# Patient Record
Sex: MALE | Race: ASIAN
[De-identification: names, ages, dates, MRNs, and addresses within clinical notes are randomized per-mention and may not be internally consistent; named-entity substitution may affect disease eponyms.]

---

## 2023-07-07 ENCOUNTER — HOSPITAL ENCOUNTER (INPATIENT)
Dept: HOSPITAL 12 - ER | Age: 78
LOS: 7 days | Discharge: HOME HEALTH | DRG: 421 | End: 2023-07-14
Attending: INTERNAL MEDICINE
Payer: COMMERCIAL

## 2023-07-07 VITALS — WEIGHT: 137 LBS | HEIGHT: 67 IN | BODY MASS INDEX: 21.5 KG/M2

## 2023-07-07 DIAGNOSIS — E87.6: ICD-10-CM

## 2023-07-07 DIAGNOSIS — F01.50: ICD-10-CM

## 2023-07-07 DIAGNOSIS — D63.8: ICD-10-CM

## 2023-07-07 DIAGNOSIS — G93.89: ICD-10-CM

## 2023-07-07 DIAGNOSIS — E88.09: ICD-10-CM

## 2023-07-07 DIAGNOSIS — K29.70: ICD-10-CM

## 2023-07-07 DIAGNOSIS — E86.0: ICD-10-CM

## 2023-07-07 DIAGNOSIS — R13.10: ICD-10-CM

## 2023-07-07 DIAGNOSIS — I50.32: ICD-10-CM

## 2023-07-07 DIAGNOSIS — M17.11: ICD-10-CM

## 2023-07-07 DIAGNOSIS — E87.1: ICD-10-CM

## 2023-07-07 DIAGNOSIS — R62.7: Primary | ICD-10-CM

## 2023-07-07 DIAGNOSIS — Z79.899: ICD-10-CM

## 2023-07-07 DIAGNOSIS — Z20.822: ICD-10-CM

## 2023-07-07 DIAGNOSIS — N40.0: ICD-10-CM

## 2023-07-07 DIAGNOSIS — D72.829: ICD-10-CM

## 2023-07-07 DIAGNOSIS — Z86.73: ICD-10-CM

## 2023-07-07 DIAGNOSIS — Z74.09: ICD-10-CM

## 2023-07-07 DIAGNOSIS — N28.1: ICD-10-CM

## 2023-07-07 DIAGNOSIS — D68.59: ICD-10-CM

## 2023-07-07 DIAGNOSIS — M65.261: ICD-10-CM

## 2023-07-07 DIAGNOSIS — R80.9: ICD-10-CM

## 2023-07-07 DIAGNOSIS — E43: ICD-10-CM

## 2023-07-07 DIAGNOSIS — G92.8: ICD-10-CM

## 2023-07-07 DIAGNOSIS — E78.5: ICD-10-CM

## 2023-07-07 DIAGNOSIS — Z79.82: ICD-10-CM

## 2023-07-07 DIAGNOSIS — R74.01: ICD-10-CM

## 2023-07-07 DIAGNOSIS — I11.0: ICD-10-CM

## 2023-07-07 DIAGNOSIS — N17.0: ICD-10-CM

## 2023-07-07 DIAGNOSIS — D75.839: ICD-10-CM

## 2023-07-07 DIAGNOSIS — R59.0: ICD-10-CM

## 2023-07-07 LAB
ALP SERPL-CCNC: 267 U/L (ref 50–136)
ALT SERPL W/O P-5'-P-CCNC: 50 U/L (ref 16–63)
AST SERPL-CCNC: 42 U/L (ref 15–37)
BILIRUB DIRECT SERPL-MCNC: 0.4 MG/DL (ref 0–0.2)
BILIRUB SERPL-MCNC: 0.8 MG/DL (ref 0.2–1)
BUN SERPL-MCNC: 11 MG/DL (ref 7–18)
CHLORIDE SERPL-SCNC: 103 MMOL/L (ref 98–107)
CO2 SERPL-SCNC: 25 MMOL/L (ref 21–32)
CREAT SERPL-MCNC: 1.4 MG/DL (ref 0.6–1.3)
HCT VFR BLD AUTO: 35.2 % (ref 36.7–47.1)
MCH RBC QN AUTO: 31 UUG (ref 23.8–33.4)
MCV RBC AUTO: 92.2 FL (ref 73–96.2)
PLATELET # BLD AUTO: 326 K/UL (ref 152–348)
POTASSIUM SERPL-SCNC: 3.5 MMOL/L (ref 3.5–5.1)
WS STN SPEC: 8.3 G/DL (ref 6.4–8.2)

## 2023-07-07 PROCEDURE — G0378 HOSPITAL OBSERVATION PER HR: HCPCS

## 2023-07-07 PROCEDURE — C9113 INJ PANTOPRAZOLE SODIUM, VIA: HCPCS

## 2023-07-08 VITALS — SYSTOLIC BLOOD PRESSURE: 142 MMHG | OXYGEN SATURATION: 98 % | TEMPERATURE: 98.6 F | DIASTOLIC BLOOD PRESSURE: 78 MMHG

## 2023-07-08 VITALS — SYSTOLIC BLOOD PRESSURE: 126 MMHG | TEMPERATURE: 99.4 F | DIASTOLIC BLOOD PRESSURE: 61 MMHG | OXYGEN SATURATION: 96 %

## 2023-07-08 VITALS — OXYGEN SATURATION: 98 % | TEMPERATURE: 98.8 F | DIASTOLIC BLOOD PRESSURE: 71 MMHG | SYSTOLIC BLOOD PRESSURE: 131 MMHG

## 2023-07-08 VITALS — TEMPERATURE: 98.5 F | OXYGEN SATURATION: 99 % | SYSTOLIC BLOOD PRESSURE: 132 MMHG | DIASTOLIC BLOOD PRESSURE: 73 MMHG

## 2023-07-08 VITALS — SYSTOLIC BLOOD PRESSURE: 139 MMHG | DIASTOLIC BLOOD PRESSURE: 67 MMHG | TEMPERATURE: 98.7 F | OXYGEN SATURATION: 96 %

## 2023-07-08 LAB
APPEARANCE UR: CLEAR
BILIRUB UR QL STRIP: NEGATIVE
COLOR UR: YELLOW
GLUCOSE UR STRIP-MCNC: NEGATIVE MG/DL
HGB UR QL STRIP: NEGATIVE
KETONES UR STRIP-MCNC: (no result) MG/DL
LEUKOCYTE ESTERASE UR QL STRIP: NEGATIVE
NITRITE UR QL STRIP: NEGATIVE
PH UR STRIP: 7 [PH] (ref 5–8)
SP GR UR STRIP: 1.01 (ref 1–1.03)
UROBILINOGEN UR STRIP-MCNC: 0.2 E.U./DL

## 2023-07-08 RX ADMIN — HEPARIN SODIUM SCH UNITS: 5000 INJECTION, SOLUTION INTRAVENOUS; SUBCUTANEOUS at 08:21

## 2023-07-08 RX ADMIN — DEXTROSE AND SODIUM CHLORIDE PRN MLS/HR: 5; .45 INJECTION, SOLUTION INTRAVENOUS at 12:49

## 2023-07-08 RX ADMIN — HEPARIN SODIUM SCH UNITS: 5000 INJECTION, SOLUTION INTRAVENOUS; SUBCUTANEOUS at 20:41

## 2023-07-08 RX ADMIN — DEXTROSE AND SODIUM CHLORIDE PRN MLS/HR: 5; .45 INJECTION, SOLUTION INTRAVENOUS at 01:52

## 2023-07-08 RX ADMIN — PANTOPRAZOLE SODIUM SCH MG: 40 TABLET, DELAYED RELEASE ORAL at 06:30

## 2023-07-08 RX ADMIN — ACETAMINOPHEN PRN MG: 325 TABLET ORAL at 14:40

## 2023-07-08 NOTE — NUR
Pt rested well in between care; no acute distress; urine sent to lab; needs attended; safety 
maintained.

## 2023-07-08 NOTE — NUR
REPORT GIVEN BY AM NURSE JOHNSON PT HAS IVF D51/2NS INFUSING AT 75ML/HR PT TOLERATING WELL NO 
SIGNS OF DISTRESS NOTED.  PT GIVEN MEDICATION AS ORDERED NO SIGNS OF BLEEDING FROM GIVEN 
HEPARIN PLATELETS 326.  PT CARE GIVEN AND BILATERAL WRIST RESTRAINTS CHECKED AND OFFERED 
FLUIDS  PT REFUSED NO SIGNS OF BRUISING FROM SITE WILL GIVE MEDICATION AS ORDERED NO SIGNS 
OF  ADVERSE REACTION MEDICATION WILL CONTINUE TO MONITOR FOR SAFETY AND FALL HOURLY AND PRN.

## 2023-07-09 VITALS — TEMPERATURE: 100.6 F | SYSTOLIC BLOOD PRESSURE: 117 MMHG | OXYGEN SATURATION: 97 % | DIASTOLIC BLOOD PRESSURE: 60 MMHG

## 2023-07-09 VITALS — DIASTOLIC BLOOD PRESSURE: 68 MMHG | TEMPERATURE: 99.4 F | OXYGEN SATURATION: 96 % | SYSTOLIC BLOOD PRESSURE: 128 MMHG

## 2023-07-09 VITALS — DIASTOLIC BLOOD PRESSURE: 75 MMHG | SYSTOLIC BLOOD PRESSURE: 124 MMHG | OXYGEN SATURATION: 98 % | TEMPERATURE: 98.8 F

## 2023-07-09 VITALS — TEMPERATURE: 98.6 F | OXYGEN SATURATION: 94 % | SYSTOLIC BLOOD PRESSURE: 150 MMHG | DIASTOLIC BLOOD PRESSURE: 65 MMHG

## 2023-07-09 LAB
ALP SERPL-CCNC: 209 U/L (ref 50–136)
ALT SERPL W/O P-5'-P-CCNC: 32 U/L (ref 16–63)
AST SERPL-CCNC: 27 U/L (ref 15–37)
BILIRUB SERPL-MCNC: 0.8 MG/DL (ref 0.2–1)
BUN SERPL-MCNC: 6 MG/DL (ref 7–18)
CHLORIDE SERPL-SCNC: 103 MMOL/L (ref 98–107)
CO2 SERPL-SCNC: 24 MMOL/L (ref 21–32)
CREAT SERPL-MCNC: 1.4 MG/DL (ref 0.6–1.3)
HCT VFR BLD AUTO: 31.7 % (ref 36.7–47.1)
MAGNESIUM SERPL-MCNC: 2 MG/DL (ref 1.8–2.4)
MCH RBC QN AUTO: 31.1 UUG (ref 23.8–33.4)
MCV RBC AUTO: 92.4 FL (ref 73–96.2)
PHOSPHATE SERPL-MCNC: 3.1 MG/DL (ref 2.5–4.9)
PLATELET # BLD AUTO: 365 K/UL (ref 152–348)
POTASSIUM SERPL-SCNC: 3.3 MMOL/L (ref 3.5–5.1)
WS STN SPEC: 7.6 G/DL (ref 6.4–8.2)

## 2023-07-09 RX ADMIN — PANTOPRAZOLE SODIUM SCH MG: 40 TABLET, DELAYED RELEASE ORAL at 06:58

## 2023-07-09 RX ADMIN — HEPARIN SODIUM SCH UNITS: 5000 INJECTION, SOLUTION INTRAVENOUS; SUBCUTANEOUS at 10:00

## 2023-07-09 RX ADMIN — ACETAMINOPHEN PRN MG: 325 TABLET ORAL at 11:14

## 2023-07-09 RX ADMIN — DEXTROSE SCH MLS/HR: 50 INJECTION, SOLUTION INTRAVENOUS at 18:25

## 2023-07-09 RX ADMIN — ACETAMINOPHEN PRN MG: 325 TABLET ORAL at 11:08

## 2023-07-09 RX ADMIN — POTASSIUM CHLORIDE SCH MLS/HR: 14.9 INJECTION, SOLUTION INTRAVENOUS at 10:13

## 2023-07-09 RX ADMIN — POTASSIUM CHLORIDE SCH MLS/HR: 14.9 INJECTION, SOLUTION INTRAVENOUS at 11:14

## 2023-07-09 RX ADMIN — HEPARIN SODIUM SCH UNITS: 5000 INJECTION, SOLUTION INTRAVENOUS; SUBCUTANEOUS at 20:19

## 2023-07-09 RX ADMIN — DEXTROSE AND SODIUM CHLORIDE PRN MLS/HR: 5; .45 INJECTION, SOLUTION INTRAVENOUS at 07:11

## 2023-07-09 RX ADMIN — DEXTROSE AND SODIUM CHLORIDE PRN MLS/HR: 5; .45 INJECTION, SOLUTION INTRAVENOUS at 21:03

## 2023-07-09 NOTE — NUR
Rcvd pt in bed resting comfortably. No s/s of respiratory distress. Bilateral soft restraint 
released q 2 hours to check circulation. IV right FA #20 patent and intact running D5 1/2 NS 
@75 cc/hr. Family on bed side. 2 bags of potassium given and pt. tolerated it well.

## 2023-07-09 NOTE — NUR
Telephone call to patient daughter Jagruti and obtained telephone consent for patient's CT 
abd/pelvis with contrast, consent witnessed by Charge nurse Kathrine FOWLER Called radiology 
tech/Zane and informed that consent is obtained.

## 2023-07-09 NOTE — NUR
Right FA IV patent and intact with D51/2 NS running at 75 cc/hr. MD orders US KIDNEY, CHEST 
X-RAY, AND SWALLOW EVAL. FAMILY WILL BRING HOME MEDS NOEMI 07/10/23. REPOSITION PT AND RELEASE 
 BILATERAL SOFT WRIST RESTRAINT TO CHECK CIRCULATION. Potassium 20 meq iv, given as ordered.

## 2023-07-09 NOTE — NUR
Received patient lying in bed. Asleep, arouse to touch. Alert orientedx1-2. In no apparent 
distress. No signs or symptoms of pain or SOB. IV site on left AC and right hand intact and 
patent. IVF infusing. Bilateral soft wrist restraint in place. Circulation checked. Needs 
assessed and anticipated to. Safety measure initiated. Continue to monitor.

## 2023-07-10 VITALS — OXYGEN SATURATION: 96 % | DIASTOLIC BLOOD PRESSURE: 68 MMHG | SYSTOLIC BLOOD PRESSURE: 121 MMHG | TEMPERATURE: 98.4 F

## 2023-07-10 VITALS — SYSTOLIC BLOOD PRESSURE: 127 MMHG | DIASTOLIC BLOOD PRESSURE: 64 MMHG | TEMPERATURE: 97.6 F | OXYGEN SATURATION: 97 %

## 2023-07-10 VITALS — OXYGEN SATURATION: 98 % | DIASTOLIC BLOOD PRESSURE: 75 MMHG | TEMPERATURE: 99.3 F | SYSTOLIC BLOOD PRESSURE: 143 MMHG

## 2023-07-10 VITALS — TEMPERATURE: 98.4 F | SYSTOLIC BLOOD PRESSURE: 154 MMHG | DIASTOLIC BLOOD PRESSURE: 62 MMHG

## 2023-07-10 LAB
ALP SERPL-CCNC: 267 U/L (ref 50–136)
ALT SERPL W/O P-5'-P-CCNC: 56 U/L (ref 16–63)
AST SERPL-CCNC: 53 U/L (ref 15–37)
BILIRUB SERPL-MCNC: 1 MG/DL (ref 0.2–1)
BUN SERPL-MCNC: 7 MG/DL (ref 7–18)
CHLORIDE SERPL-SCNC: 99 MMOL/L (ref 98–107)
CO2 SERPL-SCNC: 24 MMOL/L (ref 21–32)
CREAT SERPL-MCNC: 1.3 MG/DL (ref 0.6–1.3)
HCT VFR BLD AUTO: 29.4 % (ref 36.7–47.1)
MAGNESIUM SERPL-MCNC: 1.8 MG/DL (ref 1.8–2.4)
MCH RBC QN AUTO: 31.1 UUG (ref 23.8–33.4)
MCV RBC AUTO: 90.2 FL (ref 73–96.2)
PHOSPHATE SERPL-MCNC: 3 MG/DL (ref 2.5–4.9)
PLATELET # BLD AUTO: 416 K/UL (ref 152–348)
POTASSIUM SERPL-SCNC: 3.2 MMOL/L (ref 3.5–5.1)
WS STN SPEC: 8.3 G/DL (ref 6.4–8.2)

## 2023-07-10 RX ADMIN — DEXTROSE SCH MLS/HR: 50 INJECTION, SOLUTION INTRAVENOUS at 17:58

## 2023-07-10 RX ADMIN — POTASSIUM CHLORIDE SCH MLS/HR: 14.9 INJECTION, SOLUTION INTRAVENOUS at 15:18

## 2023-07-10 RX ADMIN — DEXTROSE SCH MG: 50 INJECTION, SOLUTION INTRAVENOUS at 08:29

## 2023-07-10 RX ADMIN — POTASSIUM CHLORIDE SCH MLS/HR: 14.9 INJECTION, SOLUTION INTRAVENOUS at 12:00

## 2023-07-10 RX ADMIN — DEXTROSE AND SODIUM CHLORIDE PRN MLS/HR: 5; .45 INJECTION, SOLUTION INTRAVENOUS at 12:07

## 2023-07-10 RX ADMIN — POTASSIUM CHLORIDE SCH MLS/HR: 14.9 INJECTION, SOLUTION INTRAVENOUS at 14:17

## 2023-07-10 RX ADMIN — POTASSIUM CHLORIDE SCH MLS/HR: 14.9 INJECTION, SOLUTION INTRAVENOUS at 12:05

## 2023-07-10 RX ADMIN — HEPARIN SODIUM SCH UNITS: 5000 INJECTION, SOLUTION INTRAVENOUS; SUBCUTANEOUS at 08:29

## 2023-07-10 RX ADMIN — HEPARIN SODIUM SCH UNITS: 5000 INJECTION, SOLUTION INTRAVENOUS; SUBCUTANEOUS at 20:22

## 2023-07-10 NOTE — NUR
No adverse effect noted from IV ATB. 4 bags of Potassium given for hypokalemia. Pt. is now 
in pureed diet with medications crushed. Tylenol 650 mg as ordered for slight elevated temp.

## 2023-07-10 NOTE — NUR
Rcvd pt. in bed. No distress noted. Released pt bilateral soft wrist restraint to check 
circulation. Family on bedside. Started 40meq potassium. Awaiting for swallow evaluation.

## 2023-07-10 NOTE — NUR
Received patient lying in bed. Asleep but arouse to tactile stimuli. AOx1-2. In no acute 
distress. No signs or symptoms of pain or SOB. IV site on left AC and right hand intact and 
patent. IVF infusing. Tio. wrist restraint in place. Circulation checked. Needs assessed and 
attended to. Safety measure initiated and call light within reached.

## 2023-07-10 NOTE — NUR
No adverse reaction noted from IV antibiotics. IVF continue to infused. Tio soft wrist 
restraints remains in place. Safety measure maintained and call light within reached.

## 2023-07-11 VITALS — TEMPERATURE: 99.2 F | SYSTOLIC BLOOD PRESSURE: 119 MMHG | DIASTOLIC BLOOD PRESSURE: 56 MMHG | OXYGEN SATURATION: 98 %

## 2023-07-11 VITALS — SYSTOLIC BLOOD PRESSURE: 140 MMHG | TEMPERATURE: 99.2 F | OXYGEN SATURATION: 99 % | DIASTOLIC BLOOD PRESSURE: 76 MMHG

## 2023-07-11 VITALS — SYSTOLIC BLOOD PRESSURE: 131 MMHG | TEMPERATURE: 97.4 F | OXYGEN SATURATION: 98 % | DIASTOLIC BLOOD PRESSURE: 72 MMHG

## 2023-07-11 VITALS — DIASTOLIC BLOOD PRESSURE: 80 MMHG | TEMPERATURE: 99.5 F | OXYGEN SATURATION: 98 % | SYSTOLIC BLOOD PRESSURE: 127 MMHG

## 2023-07-11 LAB
BUN SERPL-MCNC: 10 MG/DL (ref 7–18)
CHLORIDE SERPL-SCNC: 100 MMOL/L (ref 98–107)
CO2 SERPL-SCNC: 26 MMOL/L (ref 21–32)
CREAT SERPL-MCNC: 1.2 MG/DL (ref 0.6–1.3)
HCT VFR BLD AUTO: 26.1 % (ref 36.7–47.1)
MAGNESIUM SERPL-MCNC: 1.9 MG/DL (ref 1.8–2.4)
MCH RBC QN AUTO: 31.6 UUG (ref 23.8–33.4)
MCV RBC AUTO: 91.3 FL (ref 73–96.2)
PHOSPHATE SERPL-MCNC: 3.4 MG/DL (ref 2.5–4.9)
PLATELET # BLD AUTO: 406 K/UL (ref 152–348)
POTASSIUM SERPL-SCNC: 3.3 MMOL/L (ref 3.5–5.1)

## 2023-07-11 RX ADMIN — HEPARIN SODIUM SCH UNITS: 5000 INJECTION, SOLUTION INTRAVENOUS; SUBCUTANEOUS at 08:31

## 2023-07-11 RX ADMIN — HEPARIN SODIUM SCH UNITS: 5000 INJECTION, SOLUTION INTRAVENOUS; SUBCUTANEOUS at 20:20

## 2023-07-11 RX ADMIN — DEXTROSE AND SODIUM CHLORIDE PRN MLS/HR: 5; .45 INJECTION, SOLUTION INTRAVENOUS at 05:51

## 2023-07-11 RX ADMIN — POTASSIUM CHLORIDE SCH MLS/HR: 14.9 INJECTION, SOLUTION INTRAVENOUS at 10:10

## 2023-07-11 RX ADMIN — DEXTROSE SCH MG: 50 INJECTION, SOLUTION INTRAVENOUS at 08:29

## 2023-07-11 RX ADMIN — ACETAMINOPHEN PRN MG: 325 TABLET ORAL at 20:50

## 2023-07-11 RX ADMIN — DEXTROSE SCH MLS/HR: 50 INJECTION, SOLUTION INTRAVENOUS at 17:07

## 2023-07-11 RX ADMIN — POTASSIUM CHLORIDE SCH MLS/HR: 14.9 INJECTION, SOLUTION INTRAVENOUS at 11:38

## 2023-07-11 NOTE — NUR
Received patient lying in bed. AAOx4. Complaining pain on both knee, will provide pain 
medication PRN per order. No signs or symptoms of SOB. IV site on left AC and right hand 
intact and patent.  Bilateral soft wrist restraint in place. Circulation checked. Needs 
assessed and attended to. Safety measure initiated.

## 2023-07-11 NOTE — NUR
Rcvd pt in bed resting comfortably. Afebrile. Released jose rafael soft wrist restraint to check 
circulation. IV hydration d/c. Consumed very minimal breakfast. Family on bedside.

## 2023-07-11 NOTE — NUR
20meq Potassium given. IV ATB infusing. No adverse reaction noted at this time. CT lower 
extremities ordered. Family on bedside. Reposition and release wrist restraint q 2 hours. 
Pt. is a feeder and he rarely and barely eats.

## 2023-07-11 NOTE — NUR
Patient slept through out the shift. No complain of pain or SOB. Afebrile. IVF fluids 
continue to infuse. Turned and reposition for comfort. Kept clean and dry. Safety measure 
maintained and call light within reached.

## 2023-07-12 VITALS — DIASTOLIC BLOOD PRESSURE: 69 MMHG | OXYGEN SATURATION: 95 % | SYSTOLIC BLOOD PRESSURE: 124 MMHG | TEMPERATURE: 98.6 F

## 2023-07-12 VITALS — TEMPERATURE: 98.3 F | OXYGEN SATURATION: 99 % | DIASTOLIC BLOOD PRESSURE: 69 MMHG | SYSTOLIC BLOOD PRESSURE: 130 MMHG

## 2023-07-12 VITALS — DIASTOLIC BLOOD PRESSURE: 66 MMHG | TEMPERATURE: 97.8 F | SYSTOLIC BLOOD PRESSURE: 126 MMHG | OXYGEN SATURATION: 99 %

## 2023-07-12 VITALS — OXYGEN SATURATION: 100 % | TEMPERATURE: 98.6 F | DIASTOLIC BLOOD PRESSURE: 70 MMHG | SYSTOLIC BLOOD PRESSURE: 148 MMHG

## 2023-07-12 VITALS — TEMPERATURE: 98.4 F | OXYGEN SATURATION: 98 % | DIASTOLIC BLOOD PRESSURE: 75 MMHG | SYSTOLIC BLOOD PRESSURE: 143 MMHG

## 2023-07-12 LAB
BUN SERPL-MCNC: 13 MG/DL (ref 7–18)
CHLORIDE SERPL-SCNC: 102 MMOL/L (ref 98–107)
CO2 SERPL-SCNC: 24 MMOL/L (ref 21–32)
CREAT SERPL-MCNC: 1.1 MG/DL (ref 0.6–1.3)
HCT VFR BLD AUTO: 27.2 % (ref 36.7–47.1)
MAGNESIUM SERPL-MCNC: 2 MG/DL (ref 1.8–2.4)
MCH RBC QN AUTO: 31.1 UUG (ref 23.8–33.4)
MCV RBC AUTO: 89.9 FL (ref 73–96.2)
PHOSPHATE SERPL-MCNC: 3.2 MG/DL (ref 2.5–4.9)
PLATELET # BLD AUTO: 502 K/UL (ref 152–348)
POTASSIUM SERPL-SCNC: 4 MMOL/L (ref 3.5–5.1)

## 2023-07-12 RX ADMIN — DEXTROSE SCH MG: 50 INJECTION, SOLUTION INTRAVENOUS at 08:38

## 2023-07-12 RX ADMIN — ACETAMINOPHEN PRN MG: 325 TABLET ORAL at 15:16

## 2023-07-12 RX ADMIN — DEXTROSE SCH MLS/HR: 50 INJECTION, SOLUTION INTRAVENOUS at 17:41

## 2023-07-12 RX ADMIN — HEPARIN SODIUM SCH UNITS: 5000 INJECTION, SOLUTION INTRAVENOUS; SUBCUTANEOUS at 08:38

## 2023-07-12 RX ADMIN — HEPARIN SODIUM SCH UNITS: 5000 INJECTION, SOLUTION INTRAVENOUS; SUBCUTANEOUS at 20:56

## 2023-07-12 NOTE — NUR
Patient removed IV catheter, reinserted new IV catheter at right hand g.20 intact and 
patent.

Applied soft wrist restraint on both arms, observed patient from time to time

Repositioned patient every 2 hours

Needs attended

## 2023-07-12 NOTE — NUR
Received patient lying in bed awake, alert and oriented sometimes confused. In no acute 
distress, no SOB

Vital signs taken and recorded

Due medications given

Soft wrist restraints taken off and observed, sister on the bedside

Observed accordingly, needs attended

## 2023-07-12 NOTE — NUR
NSG: Received patient lying in bed,very agitated, pulling iv line and trying to get pout of 
bed. Dr. Moustapha chavez made aware via text. received order for zyprexa 5 mg im x1 for 
agitation. family @ bedside and requested to give him medication for anxiety. b/p 130/69.hr 
93 resp 20 temp 98.3 sat 99% in room air. patient is alert and oriented x2-3. denies pain or 
discomfort at this time. call light w/in reach. charge nurse aware.continue monitoring for 
safety.

## 2023-07-12 NOTE — NUR
In no acute distress. No complain of pain or SOB. Afebrile. IV site on left AC and right 
hand intact and patent. Tio wrist restraint in place. Turned and reposition for comfort. 
Kept clean and dry. Safety measure maintained and call light within reached.

## 2023-07-12 NOTE — NUR
NSG: Received patient lying in bed. Afebrile. Released both side  soft wrist restraint to 
check circulation. Family on bedside. call light w/in reach.

## 2023-07-12 NOTE — NUR
Patient was seen by speech therapist. Patient refused to eat just a little amount for 
breakfast and lunch ST informed

Seen by physical therapist

## 2023-07-13 VITALS — OXYGEN SATURATION: 99 % | DIASTOLIC BLOOD PRESSURE: 68 MMHG | TEMPERATURE: 97.9 F | SYSTOLIC BLOOD PRESSURE: 114 MMHG

## 2023-07-13 VITALS — TEMPERATURE: 98.3 F | OXYGEN SATURATION: 100 % | DIASTOLIC BLOOD PRESSURE: 70 MMHG | SYSTOLIC BLOOD PRESSURE: 126 MMHG

## 2023-07-13 VITALS — OXYGEN SATURATION: 98 % | SYSTOLIC BLOOD PRESSURE: 138 MMHG | TEMPERATURE: 98.5 F | DIASTOLIC BLOOD PRESSURE: 56 MMHG

## 2023-07-13 VITALS — TEMPERATURE: 98.4 F | DIASTOLIC BLOOD PRESSURE: 71 MMHG | SYSTOLIC BLOOD PRESSURE: 115 MMHG | OXYGEN SATURATION: 98 %

## 2023-07-13 VITALS — OXYGEN SATURATION: 99 % | TEMPERATURE: 98 F | SYSTOLIC BLOOD PRESSURE: 133 MMHG | DIASTOLIC BLOOD PRESSURE: 79 MMHG

## 2023-07-13 LAB
BUN SERPL-MCNC: 13 MG/DL (ref 7–18)
CHLORIDE SERPL-SCNC: 102 MMOL/L (ref 98–107)
CO2 SERPL-SCNC: 27 MMOL/L (ref 21–32)
CREAT SERPL-MCNC: 1.2 MG/DL (ref 0.6–1.3)
HCT VFR BLD AUTO: 29.3 % (ref 36.7–47.1)
MAGNESIUM SERPL-MCNC: 2.1 MG/DL (ref 1.8–2.4)
MCH RBC QN AUTO: 30.9 UUG (ref 23.8–33.4)
MCV RBC AUTO: 91 FL (ref 73–96.2)
PHOSPHATE SERPL-MCNC: 3.2 MG/DL (ref 2.5–4.9)
PLATELET # BLD AUTO: 600 K/UL (ref 152–348)
POTASSIUM SERPL-SCNC: 4.3 MMOL/L (ref 3.5–5.1)

## 2023-07-13 PROCEDURE — 0DH63UZ INSERTION OF FEEDING DEVICE INTO STOMACH, PERCUTANEOUS APPROACH: ICD-10-PCS

## 2023-07-13 RX ADMIN — DEXTROSE SCH MG: 50 INJECTION, SOLUTION INTRAVENOUS at 08:52

## 2023-07-13 RX ADMIN — HEPARIN SODIUM SCH UNITS: 5000 INJECTION, SOLUTION INTRAVENOUS; SUBCUTANEOUS at 21:40

## 2023-07-13 RX ADMIN — DEXTROSE SCH MLS/HR: 50 INJECTION, SOLUTION INTRAVENOUS at 17:42

## 2023-07-13 RX ADMIN — HEPARIN SODIUM SCH UNITS: 5000 INJECTION, SOLUTION INTRAVENOUS; SUBCUTANEOUS at 08:53

## 2023-07-13 NOTE — NUR
Pt returned from OR at 8PM. Per RN Katrin report:

1. PEG placement done 

2. 2x2 dressing placed

3. Abdominal binder placed over PEG

4. OK to give meds as needed via PEG

5. Tube feeding to start tomorrow.

6. Pt on 3L O2 NC

## 2023-07-13 NOTE — NUR
Received patient lying in bed awake, alert and oriented sometimes confused. In no acute 
distress, no SOB

Vital signs taken and recorded

Due medications given

Soft wrist restraints on, observed safety measures

Seen by physical therapist

Observed accordingly, needs attended

## 2023-07-13 NOTE — NUR
Patient brought to OR assisted by OR nurse via stretcher for PEG placement under Dr. Fuentes. 

Consent signed for the procedure by sister Jagruti, informed about the procedure

Pre-op checklist done

Vital taken and recorded

Transferred to OR

## 2023-07-13 NOTE — NUR
NSG: Patient slept on and off through out the night. No complain of pain or SOB. Afebrile.  
Turned and reposition for comfort. good ileana care provided. Kept clean and dry. continue 
with soft restraint to bilat wrist. Safety measure maintained and call light within reached.

## 2023-07-13 NOTE — NUR
Received a call from OR patient is scheduled for EGD with PEG placement under Dr. Fuentes at 
6pm. NPO instructed.  Consent signed by sister informed about the procedure. Keep patient 
monitored

## 2023-07-14 VITALS — SYSTOLIC BLOOD PRESSURE: 137 MMHG | OXYGEN SATURATION: 98 % | DIASTOLIC BLOOD PRESSURE: 81 MMHG | TEMPERATURE: 98.3 F

## 2023-07-14 VITALS — OXYGEN SATURATION: 99 % | SYSTOLIC BLOOD PRESSURE: 117 MMHG | TEMPERATURE: 97.5 F | DIASTOLIC BLOOD PRESSURE: 66 MMHG

## 2023-07-14 VITALS — OXYGEN SATURATION: 100 %

## 2023-07-14 LAB
BUN SERPL-MCNC: 20 MG/DL (ref 7–18)
CHLORIDE SERPL-SCNC: 105 MMOL/L (ref 98–107)
CO2 SERPL-SCNC: 24 MMOL/L (ref 21–32)
CREAT SERPL-MCNC: 1.2 MG/DL (ref 0.6–1.3)
HCT VFR BLD AUTO: 26.8 % (ref 36.7–47.1)
MAGNESIUM SERPL-MCNC: 2.3 MG/DL (ref 1.8–2.4)
MCH RBC QN AUTO: 31.9 UUG (ref 23.8–33.4)
MCV RBC AUTO: 89.9 FL (ref 73–96.2)
PHOSPHATE SERPL-MCNC: 3.4 MG/DL (ref 2.5–4.9)
PLATELET # BLD AUTO: 646 K/UL (ref 152–348)
POTASSIUM SERPL-SCNC: 3.8 MMOL/L (ref 3.5–5.1)

## 2023-07-14 RX ADMIN — DEXTROSE SCH MG: 50 INJECTION, SOLUTION INTRAVENOUS at 09:05

## 2023-07-14 RX ADMIN — HEPARIN SODIUM SCH UNITS: 5000 INJECTION, SOLUTION INTRAVENOUS; SUBCUTANEOUS at 09:06

## 2023-07-14 NOTE — NUR
Dr. Anglin did knee injection of triamcinolone acetonide with xylocaine assisted on bed side.

Discharge paper given and instructed to sister Jagruti, answered all questions

Discharge coordinated by , patient fro pick with ambulance

All basic supplies for the PEG given to the family

IV catheter removed, clean and dry

Removed line from feeding pump

Needs Attended

Discharge at 1450

## 2023-07-14 NOTE — NUR
Received patient lying in bed awake, alert and oriented x 1-2, no signs of distress, no SOB, 
Patient has PEG newly inserted intact and clean to start feeding this AM with Jevity 1.2 shanna 
with goal of 65ml/hr. IV catheter site at right hand g.20 intact. Vital signs taken and 
recorded. Due medications given. Started Jevity feeding at 20ml/hr then increase every hour 
until goal is reach. Seen and examined by Dr. Anglin with orders for discharge.